# Patient Record
Sex: FEMALE | NOT HISPANIC OR LATINO | Employment: PART TIME | URBAN - METROPOLITAN AREA
[De-identification: names, ages, dates, MRNs, and addresses within clinical notes are randomized per-mention and may not be internally consistent; named-entity substitution may affect disease eponyms.]

---

## 2020-06-04 ENCOUNTER — TRANSFERRED RECORDS (OUTPATIENT)
Dept: HEALTH INFORMATION MANAGEMENT | Facility: CLINIC | Age: 35
End: 2020-06-04

## 2020-06-09 ENCOUNTER — TRANSFERRED RECORDS (OUTPATIENT)
Dept: HEALTH INFORMATION MANAGEMENT | Facility: CLINIC | Age: 35
End: 2020-06-09

## 2020-06-17 ENCOUNTER — TRANSCRIBE ORDERS (OUTPATIENT)
Dept: OTHER | Age: 35
End: 2020-06-17

## 2020-06-17 ENCOUNTER — MEDICAL CORRESPONDENCE (OUTPATIENT)
Dept: HEALTH INFORMATION MANAGEMENT | Facility: CLINIC | Age: 35
End: 2020-06-17

## 2020-06-17 DIAGNOSIS — H53.9 VISUAL DISTURBANCE: Primary | ICD-10-CM

## 2023-06-07 ENCOUNTER — TRANSFERRED RECORDS (OUTPATIENT)
Dept: HEALTH INFORMATION MANAGEMENT | Facility: CLINIC | Age: 38
End: 2023-06-07
Payer: COMMERCIAL

## 2023-06-29 ENCOUNTER — TRANSCRIBE ORDERS (OUTPATIENT)
Dept: OTHER | Age: 38
End: 2023-06-29

## 2023-06-29 DIAGNOSIS — H52.02 HYPERMETROPIA, LEFT EYE: Primary | ICD-10-CM

## 2023-06-29 DIAGNOSIS — G93.2 BENIGN INTRACRANIAL HYPERTENSION: ICD-10-CM

## 2023-06-29 DIAGNOSIS — H04.123 DRY EYE SYNDROME OF BILATERAL LACRIMAL GLANDS: ICD-10-CM

## 2023-06-29 DIAGNOSIS — H52.223 REGULAR ASTIGMATISM, BILATERAL: ICD-10-CM

## 2023-08-17 ENCOUNTER — OFFICE VISIT (OUTPATIENT)
Dept: OPHTHALMOLOGY | Facility: CLINIC | Age: 38
End: 2023-08-17
Attending: OPTOMETRIST
Payer: COMMERCIAL

## 2023-08-17 ENCOUNTER — LAB (OUTPATIENT)
Dept: LAB | Facility: CLINIC | Age: 38
End: 2023-08-17
Payer: COMMERCIAL

## 2023-08-17 DIAGNOSIS — H05.20 EXOPHTHALMOS: Primary | ICD-10-CM

## 2023-08-17 DIAGNOSIS — G93.2 BENIGN INTRACRANIAL HYPERTENSION: ICD-10-CM

## 2023-08-17 DIAGNOSIS — H05.20 EXOPHTHALMOS: ICD-10-CM

## 2023-08-17 DIAGNOSIS — H53.40 VISUAL FIELD DEFECT: ICD-10-CM

## 2023-08-17 DIAGNOSIS — H53.40 VISUAL FIELD DEFECT: Primary | ICD-10-CM

## 2023-08-17 DIAGNOSIS — H02.539 EYELID RETRACTION OR LAG: ICD-10-CM

## 2023-08-17 LAB
T3FREE SERPL-MCNC: 2.8 PG/ML (ref 2–4.4)
TSH SERPL DL<=0.005 MIU/L-ACNC: 0.9 UIU/ML (ref 0.3–4.2)

## 2023-08-17 PROCEDURE — 84481 FREE ASSAY (FT-3): CPT | Performed by: OPHTHALMOLOGY

## 2023-08-17 PROCEDURE — 99204 OFFICE O/P NEW MOD 45 MIN: CPT | Mod: GC | Performed by: OPHTHALMOLOGY

## 2023-08-17 PROCEDURE — 99000 SPECIMEN HANDLING OFFICE-LAB: CPT | Performed by: PATHOLOGY

## 2023-08-17 PROCEDURE — 92083 EXTENDED VISUAL FIELD XM: CPT | Performed by: OPHTHALMOLOGY

## 2023-08-17 PROCEDURE — 84445 ASSAY OF TSI GLOBULIN: CPT | Mod: 90 | Performed by: PATHOLOGY

## 2023-08-17 PROCEDURE — 36415 COLL VENOUS BLD VENIPUNCTURE: CPT | Performed by: PATHOLOGY

## 2023-08-17 PROCEDURE — 86376 MICROSOMAL ANTIBODY EACH: CPT | Performed by: OPHTHALMOLOGY

## 2023-08-17 PROCEDURE — G0463 HOSPITAL OUTPT CLINIC VISIT: HCPCS | Performed by: OPHTHALMOLOGY

## 2023-08-17 PROCEDURE — 99214 OFFICE O/P EST MOD 30 MIN: CPT | Performed by: OPHTHALMOLOGY

## 2023-08-17 PROCEDURE — 84443 ASSAY THYROID STIM HORMONE: CPT | Performed by: PATHOLOGY

## 2023-08-17 PROCEDURE — 92133 CPTRZD OPH DX IMG PST SGM ON: CPT | Performed by: OPHTHALMOLOGY

## 2023-08-17 RX ORDER — BUPROPION HYDROCHLORIDE 150 MG/1
1 TABLET ORAL DAILY
COMMUNITY
Start: 2023-07-06 | End: 2024-07-05

## 2023-08-17 RX ORDER — LORATADINE 10 MG/1
10 TABLET ORAL DAILY
COMMUNITY
Start: 2023-06-08

## 2023-08-17 ASSESSMENT — REFRACTION_WEARINGRX
OD_CYLINDER: +0.75
OD_SPHERE: -0.50
OS_SPHERE: -0.25
OS_AXIS: 062
OS_CYLINDER: +0.75
OD_AXIS: 098

## 2023-08-17 ASSESSMENT — MARGIN REFLEX DISTANCE
OS_MRD1: 5
OD_MRD1: 5
OD_MRD2: 6
OS_MRD2: 6

## 2023-08-17 ASSESSMENT — VISUAL ACUITY
OD_SC: 20/20
OS_SC+: -1
OS_SC: 20/20
METHOD: SNELLEN - LINEAR
OD_SC+: -1

## 2023-08-17 ASSESSMENT — SLIT LAMP EXAM - LIDS
COMMENTS: NORMAL
COMMENTS: NORMAL

## 2023-08-17 ASSESSMENT — CONF VISUAL FIELD
OD_NORMAL: 1
OD_INFERIOR_NASAL_RESTRICTION: 0
OS_INFERIOR_NASAL_RESTRICTION: 0
OS_NORMAL: 1
OS_INFERIOR_TEMPORAL_RESTRICTION: 0
OD_INFERIOR_TEMPORAL_RESTRICTION: 0
OS_SUPERIOR_NASAL_RESTRICTION: 0
OD_SUPERIOR_NASAL_RESTRICTION: 0
OD_SUPERIOR_TEMPORAL_RESTRICTION: 0
OS_SUPERIOR_TEMPORAL_RESTRICTION: 0
METHOD: COUNTING FINGERS

## 2023-08-17 ASSESSMENT — CUP TO DISC RATIO
OS_RATIO: 0.0
OD_RATIO: 0.0

## 2023-08-17 ASSESSMENT — EXTERNAL EXAM - RIGHT EYE: OD_EXAM: NORMAL

## 2023-08-17 ASSESSMENT — TONOMETRY
OS_IOP_MMHG: 16
IOP_METHOD: ICARE
OD_IOP_MMHG: 12

## 2023-08-17 ASSESSMENT — EXTERNAL EXAM - LEFT EYE: OS_EXAM: NORMAL

## 2023-08-17 NOTE — LETTER
2023         RE:  :  MRN: Mariaelena Hearn  1985  [id number]     Dear Dr. Li,    Thank you for asking me to see your very pleasant patient, Mariaelena Hearn, in neuro-ophthalmic consultation.  I would like to thank you for sending your records and I have summarized them in the history of present illness.   My assessment and plan are below.  For further details, please see my attached clinic note.      Mariaelena Hearn is a pleasant 37 year old female who presents to my neuro-ophthalmology clinic today having been referred by Dr. Li for evaluation of proptosis with concerns for graves disease, in addition to history of IIH.      HPI:  Patient is coming in for routine follow-up for IIH diagnosed in 2018. Initially when diagnosed with IIH patient had elevated opening pressure on LP with associated vision loss. Had tunnel vision bilaterally. Had headaches with pulsatile tinnitus and TVO's. Patient has a history of hashimoto's disease diagnosed by functional doctor. Patient is currently not on medications to treat her IIH as she had a very severe side effect while on diamox. She had diarrhea and severe depression. Patient is on GTA (a thyroid hormone supplement) prescribed by her functional doctor Dr. Izaguirre. She has been on this for about 2 years. Since starting this medication her IIH symptoms have improved. She was previously recommended to get a shunt for her IIH prior to taking this medication. Patient is getting headaches currently every day located along the orbital rim bilaterally superiorly and inferiorly on the right side, along with periorbital, and occiptal. Associated with photophobia and phonophobia, nausea. Denies associated vomiting. Has high pitched tinnitus but no pulsatile tinnitus. Denies TVO's. Patient feels like her eyes bulge, has slight pain with EOM OD only.       Independent historians:  Patient    Review of outside testing:  No labs or imaging obtained     My  interpretation performed today of outside testing:        Review of outside clinical notes:  Optometry - Dr. Li (06/07/23):      Past medical history:  IIH   Thyroid disorder   Depression    Patient Medications:   GTA   Claritin   Bupropion   List is confirmed today.            Family history / social history:  Patient's heart disease, asthma and parkinson's disease on mother's side. Skin cancer on father's side. Mom is diabetic.     Patient   Never smoked, non smoker. Rarely alcohol intake.     Exam:  VA 20/20 each eye, anterior exam wnl for age. Posterior exam with gliotic changes each eye without edema. Strabismus N/A.       Tests ordered and interpreted today:  OCT RNFL wnl no signs of edema or atrophy, no drusen on ANNEL   VF: non specific inferior defects OD, ? Enlarged blindspot and nasal step OS          Discussion of management / interpretation with another provider:  None    Assessment/Plan:     IIH   Patient previously diagnosed IIH not currently on treatment. No papilledema today on exam or on retinal nerve fiber layer testing Okay to continue off treatment for now. Consider referral to headache specialist for chronic headaches.     2. Exophthalmos   She was diagnosed with possible Hashimoto's disease at one point, but I do not have access to the lab testing.  She has mild lid retraction and mild exophthalmos. There are no other stigmata of active Thyroid eye disease.  At this point, I would recommend checking a thyroid stimulating immunoglobulin, thyroid peroxidase antibody, and thyroid function tests.        Again, thank you for allowing me to participate in the care of your patient.      Sincerely,    Sridhar Delgado MD  Professor  Ophthalmology Residency   Director of Neuro-Ophthalmology  [facility] Endowed Chair  Departments of Ophthalmology, Neurology, and Neurosurgery  [facility] [address] [place]  [address]  T - [phone]  F -  [phone]  CECILIA [email or web address]      CC: Ata Li  [facility]  [address] [address]  Via Fax: [phone]

## 2023-08-17 NOTE — PROGRESS NOTES
Mariaelena Hearn is a pleasant 37 year old female who presents to my neuro-ophthalmology clinic today having been referred by Dr. Li for evaluation of proptosis with concerns for graves disease, in addition to history of IIH.      HPI:  Patient is coming in for routine follow-up for IIH diagnosed in 2018. Initially when diagnosed with IIH patient had elevated opening pressure on LP with associated vision loss. Had tunnel vision bilaterally. Had headaches with pulsatile tinnitus and TVO's. Patient has a history of hashimoto's disease diagnosed by functional doctor. Patient is currently not on medications to treat her IIH as she had a very severe side effect while on diamox. She had diarrhea and severe depression. Patient is on GTA (a thyroid hormone supplement) prescribed by her functional doctor Dr. Izaguirre. She has been on this for about 2 years. Since starting this medication her IIH symptoms have improved. She was previously recommended to get a shunt for her IIH prior to taking this medication. Patient is getting headaches currently every day located along the orbital rim bilaterally superiorly and inferiorly on the right side, along with periorbital, and occiptal. Associated with photophobia and phonophobia, nausea. Denies associated vomiting. Has high pitched tinnitus but no pulsatile tinnitus. Denies TVO's. Patient feels like her eyes bulge, has slight pain with EOM OD only.       Independent historians:  Patient    Review of outside testing:  No labs or imaging obtained     My interpretation performed today of outside testing:        Review of outside clinical notes:  Optometry - Dr. Li (06/07/23):      Past medical history:  IIH   Thyroid disorder   Depression    Patient Medications:   GTA   Claritin   Bupropion   List is confirmed today.            Family history / social history:  Patient's heart disease, asthma and parkinson's disease on mother's side. Skin cancer on father's side. Mom is  diabetic.     Patient   Never smoked, non smoker. Rarely alcohol intake.     Exam:  VA 20/20 each eye, anterior exam wnl for age. Posterior exam with gliotic changes each eye without edema. Strabismus N/A.       Tests ordered and interpreted today:  OCT RNFL wnl no signs of edema or atrophy, no drusen on ANNEL   VF: non specific inferior defects OD, ? Enlarged blindspot and nasal step OS          Discussion of management / interpretation with another provider:  None    Assessment/Plan:     IIH   Patient previously diagnosed IIH not currently on treatment. No papilledema today on exam or on retinal nerve fiber layer testing Okay to continue off treatment for now. Consider referral to headache specialist for chronic headaches.     2. Exophthalmos   She was diagnosed with possible Hashimoto's disease at one point, but I do not have access to the lab testing.  She has mild lid retraction and mild exophthalmos. There are no other stigmata of active Thyroid eye disease.  At this point, I would recommend checking a thyroid stimulating immunoglobulin, thyroid peroxidase antibody, and thyroid function tests.              Attending Physician Attestation:  Complete documentation of historical and exam elements from today's encounter can be found in the full encounter summary report (not reduplicated in this progress note).  I personally obtained the chief complaint(s) and history of present illness.  I confirmed and edited as necessary the review of systems, past medical/surgical history, family history, social history, and examination findings as documented by others; and I examined the patient myself.  I personally reviewed the relevant tests, images, and reports as documented above.  I formulated and edited as necessary the assessment and plan and discussed the findings and management plan with the patient and family. I personally reviewed the ophthalmic test(s) associated with this encounter, agree with the interpretation(s)  as documented by the resident/fellow, and have edited the corresponding report(s) as necessary.  - Sridhar Laws MD   Fellow, Neuro-Ophthalmology       Precharting:  Sridhar Terry, MS4  [facility]

## 2023-08-17 NOTE — NURSING NOTE
"Chief Complaint(s) and History of Present Illness(es)       New Patient    In both eyes.  Since onset it is stable.  Associated symptoms include eye pain and headache.  Negative for double vision.  Pain was noted as 0/10.             Comments    Mariaelena Hearn is a 37 year old female sent for consultation by Dr. Ata Li, OD for evaluation of proptosis.  Pt with previous hx of IIH and Hashiomoto's.  No recent or obvious changes in her vision.  Will sometimes feel light-headed or dizzy.  Sometimes she feels like her eyes are bulging.  She does have some eye pain behind her eyes and some stabbing in her cheeks. Does get some nasal headaches.  Will occasionally feel like there is a \"hot needle stabbing her eyeball.\"   RICARDO Blair 8/17/2023 9:35 AM                   "

## 2023-08-18 LAB — THYROPEROXIDASE AB SERPL-ACNC: <10 IU/ML

## 2023-08-22 LAB — TSI SER-ACNC: <1 TSI INDEX

## 2023-08-24 ENCOUNTER — TELEPHONE (OUTPATIENT)
Dept: OPHTHALMOLOGY | Facility: CLINIC | Age: 38
End: 2023-08-24
Payer: COMMERCIAL

## 2023-08-24 NOTE — TELEPHONE ENCOUNTER
Spoke to patient regarding normal labs with no evidence of thyroid dysfunction.  Dr harper would like to observe and follow up as needed for worsening symptoms.     Indira Yoo on 8/24/2023 at 11:08 AM

## 2023-12-29 ENCOUNTER — ANCILLARY PROCEDURE (OUTPATIENT)
Dept: GENERAL RADIOLOGY | Facility: CLINIC | Age: 38
End: 2023-12-29
Attending: FAMILY MEDICINE
Payer: COMMERCIAL

## 2023-12-29 ENCOUNTER — OFFICE VISIT (OUTPATIENT)
Dept: URGENT CARE | Facility: URGENT CARE | Age: 38
End: 2023-12-29
Payer: COMMERCIAL

## 2023-12-29 VITALS
RESPIRATION RATE: 16 BRPM | WEIGHT: 177 LBS | OXYGEN SATURATION: 99 % | TEMPERATURE: 97.7 F | SYSTOLIC BLOOD PRESSURE: 130 MMHG | DIASTOLIC BLOOD PRESSURE: 80 MMHG | HEART RATE: 87 BPM

## 2023-12-29 DIAGNOSIS — R07.9 CHEST PAIN, UNSPECIFIED TYPE: ICD-10-CM

## 2023-12-29 DIAGNOSIS — R05.9 COUGH, UNSPECIFIED TYPE: ICD-10-CM

## 2023-12-29 DIAGNOSIS — M54.6 ACUTE RIGHT-SIDED THORACIC BACK PAIN: Primary | ICD-10-CM

## 2023-12-29 DIAGNOSIS — R07.1 PAINFUL RESPIRATION: ICD-10-CM

## 2023-12-29 LAB
ALBUMIN SERPL-MCNC: 3.8 G/DL (ref 3.4–5)
ALP SERPL-CCNC: 38 U/L (ref 40–150)
ALT SERPL W P-5'-P-CCNC: 20 U/L (ref 0–50)
ANION GAP SERPL CALCULATED.3IONS-SCNC: 4 MMOL/L (ref 3–14)
AST SERPL W P-5'-P-CCNC: 25 U/L (ref 0–45)
BASOPHILS # BLD AUTO: 0 10E3/UL (ref 0–0.2)
BASOPHILS NFR BLD AUTO: 0 %
BILIRUB SERPL-MCNC: 0.5 MG/DL (ref 0.2–1.3)
BUN SERPL-MCNC: 13 MG/DL (ref 7–30)
CALCIUM SERPL-MCNC: 10.1 MG/DL (ref 8.5–10.1)
CHLORIDE BLD-SCNC: 107 MMOL/L (ref 94–109)
CO2 SERPL-SCNC: 32 MMOL/L (ref 20–32)
CREAT SERPL-MCNC: 0.9 MG/DL (ref 0.52–1.04)
D DIMER PPP FEU-MCNC: 0.35 UG/ML FEU (ref 0–0.5)
EGFRCR SERPLBLD CKD-EPI 2021: 83 ML/MIN/1.73M2
EOSINOPHIL # BLD AUTO: 0.2 10E3/UL (ref 0–0.7)
EOSINOPHIL NFR BLD AUTO: 3 %
ERYTHROCYTE [DISTWIDTH] IN BLOOD BY AUTOMATED COUNT: 12 % (ref 10–15)
GLUCOSE BLD-MCNC: 95 MG/DL (ref 70–99)
HCT VFR BLD AUTO: 39.5 % (ref 35–47)
HGB BLD-MCNC: 13.2 G/DL (ref 11.7–15.7)
IMM GRANULOCYTES # BLD: 0 10E3/UL
IMM GRANULOCYTES NFR BLD: 0 %
LYMPHOCYTES # BLD AUTO: 2.4 10E3/UL (ref 0.8–5.3)
LYMPHOCYTES NFR BLD AUTO: 33 %
MCH RBC QN AUTO: 29.5 PG (ref 26.5–33)
MCHC RBC AUTO-ENTMCNC: 33.4 G/DL (ref 31.5–36.5)
MCV RBC AUTO: 88 FL (ref 78–100)
MONOCYTES # BLD AUTO: 0.4 10E3/UL (ref 0–1.3)
MONOCYTES NFR BLD AUTO: 5 %
NEUTROPHILS # BLD AUTO: 4.2 10E3/UL (ref 1.6–8.3)
NEUTROPHILS NFR BLD AUTO: 59 %
PLATELET # BLD AUTO: 310 10E3/UL (ref 150–450)
POTASSIUM BLD-SCNC: 4.6 MMOL/L (ref 3.4–5.3)
PROT SERPL-MCNC: 7.1 G/DL (ref 6.8–8.8)
RBC # BLD AUTO: 4.48 10E6/UL (ref 3.8–5.2)
SODIUM SERPL-SCNC: 143 MMOL/L (ref 135–145)
WBC # BLD AUTO: 7.2 10E3/UL (ref 4–11)

## 2023-12-29 PROCEDURE — 71046 X-RAY EXAM CHEST 2 VIEWS: CPT | Mod: TC | Performed by: RADIOLOGY

## 2023-12-29 PROCEDURE — 85025 COMPLETE CBC W/AUTO DIFF WBC: CPT | Performed by: FAMILY MEDICINE

## 2023-12-29 PROCEDURE — 80053 COMPREHEN METABOLIC PANEL: CPT | Performed by: FAMILY MEDICINE

## 2023-12-29 PROCEDURE — 93000 ELECTROCARDIOGRAM COMPLETE: CPT | Performed by: FAMILY MEDICINE

## 2023-12-29 PROCEDURE — 36415 COLL VENOUS BLD VENIPUNCTURE: CPT | Performed by: FAMILY MEDICINE

## 2023-12-29 PROCEDURE — 99203 OFFICE O/P NEW LOW 30 MIN: CPT | Mod: 25 | Performed by: FAMILY MEDICINE

## 2023-12-29 PROCEDURE — 85379 FIBRIN DEGRADATION QUANT: CPT | Performed by: FAMILY MEDICINE

## 2023-12-29 RX ORDER — NAPROXEN 500 MG/1
500 TABLET ORAL 2 TIMES DAILY WITH MEALS
Qty: 14 TABLET | Refills: 0 | Status: SHIPPED | OUTPATIENT
Start: 2023-12-29 | End: 2024-01-05

## 2023-12-30 NOTE — PROGRESS NOTES
SUBJECTIVE: Mariaelena Hearn is a 38 year old female presenting with a chief complaint of cough  and rt sided pleuritic pain.  Onset of symptoms was day(s) ago.    No past medical history on file.  Allergies   Allergen Reactions    Azithromycin Anaphylaxis     Per pt    Chicken-Derived Products (Egg) Anaphylaxis    Codeine Anaphylaxis    Morphine Anaphylaxis    Sulfa Antibiotics Anaphylaxis     Social History     Tobacco Use    Smoking status: Never    Smokeless tobacco: Never   Substance Use Topics    Alcohol use: Not on file       ROS:  SKIN: no rash  GI: no vomiting    OBJECTIVE:  /80   Pulse 87   Temp 97.7  F (36.5  C) (Tympanic)   Resp 16   Wt 80.3 kg (177 lb)   SpO2 99% GENERAL APPEARANCE: healthy, alert and no distress  EYES: EOMI,  PERRL, conjunctiva clear  HENT: ear canals and TM's normal.  Nose and mouth without ulcers, erythema or lesions  RESP: lungs clear to auscultation - no rales, rhonchi or wheezes  CV: regular rates and rhythm, normal S1 S2, no murmur noted  ABDOMEN:  soft, nontender, no HSM or masses and bowel sounds normal  SKIN: no suspicious lesions or rashes    EKG NSR  CXR without pneumonia, read by Dr. Darian Romero      ICD-10-CM    1. Acute right-sided thoracic back pain  M54.6 CBC with Platelets & Differential     D dimer quantitative     Comprehensive metabolic panel     XR Chest 2 Views     naproxen (NAPROSYN) 500 MG tablet      2. Cough, unspecified type  R05.9 CBC with Platelets & Differential     D dimer quantitative     Comprehensive metabolic panel     XR Chest 2 Views      3. Painful respiration  R07.1 CBC with Platelets & Differential     D dimer quantitative     Comprehensive metabolic panel     XR Chest 2 Views     naproxen (NAPROSYN) 500 MG tablet      4. Chest pain, unspecified type  R07.9 EKG 12-lead complete w/read - Clinics     XR Chest 2 Views     naproxen (NAPROSYN) 500 MG tablet          Fluids/Rest, f/u if worse/not any better

## 2024-02-16 ENCOUNTER — NURSE TRIAGE (OUTPATIENT)
Dept: NURSING | Facility: CLINIC | Age: 39
End: 2024-02-16
Payer: COMMERCIAL

## 2024-02-17 NOTE — TELEPHONE ENCOUNTER
Triage Call:    Caller: Patient and daughter    Patient fell and hit her head and it is bleeding.  She is laying down as patient is reporting her head is hurting more.  Patient is reporting weakness in arms and leg.   Hit the back of her head.      Protocol Recommended Disposition: Call 911  Daughter verbalized she is okay calling 911.      Caller verbalized understanding of instructions and questions answered.      Maria Elena Erickson RN on 2/16/2024 at 7:38 PM      Reason for Disposition   [1] ACUTE NEURO SYMPTOM AND [2] present now  (DEFINITION: difficult to awaken OR confused thinking and talking OR slurred speech OR weakness of arms OR unsteady walking)    Protocols used: Head Injury-A-AH